# Patient Record
(demographics unavailable — no encounter records)

---

## 2025-02-24 NOTE — HISTORY OF PRESENT ILLNESS
[FreeTextEntry1] : CPE [de-identified] : 22F here for CPE Has form for school ([employer] paramed[employer] program)

## 2025-02-24 NOTE — HEALTH RISK ASSESSMENT
[Good] : ~his/her~  mood as  good [Yes] : Yes [Monthly or less (1 pt)] : Monthly or less (1 point) [1 or 2 (0 pts)] : 1 or 2 (0 points) [Never (0 pts)] : Never (0 points) [0] : 2) Feeling down, depressed, or hopeless: Not at all (0) [PHQ-2 Negative - No further assessment needed] : PHQ-2 Negative - No further assessment needed [I have developed a follow-up plan documented below in the note.] : I have developed a follow-up plan documented below in the note. [With Family] : lives with family [Student] : student [Significant Other] : lives with significant other [Sexually Active] : sexually active [Fully functional (bathing, dressing, toileting, transferring, walking, feeding)] : Fully functional (bathing, dressing, toileting, transferring, walking, feeding) [Fully functional (using the telephone, shopping, preparing meals, housekeeping, doing laundry, using] : Fully functional and needs no help or supervision to perform IADLs (using the telephone, shopping, preparing meals, housekeeping, doing laundry, using transportation, managing medications and managing finances) [Reports changes in hearing] : Reports no changes in hearing [Reports changes in vision] : Reports no changes in vision [de-identified] : Parents, brother  [de-identified] : women

## 2025-05-30 NOTE — HISTORY OF PRESENT ILLNESS
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  Was given 2 L of saline.  Electrolytes showed a sodium of 133 slightly low potassium low normal at 3.5 no anemia EKG was sinus tachycardia.  Not taking any stimulants does not drink coffee.  She has a history of low blood pressure  Patient has lost 55 pounds in the past year on Zepbound  Physical exam thyroid normal chest clear cardiac exam normal blood pressure lying down 96/60 with pulse of 70 upon arising blood pressure 92/60 with pulse of 100  Hypotension may be causing tachycardia patient encouraged to increase salt and fluids in her diet check labs if symptoms persist follow-up with cardiology